# Patient Record
Sex: FEMALE | Race: WHITE | NOT HISPANIC OR LATINO | Employment: OTHER | URBAN - METROPOLITAN AREA
[De-identification: names, ages, dates, MRNs, and addresses within clinical notes are randomized per-mention and may not be internally consistent; named-entity substitution may affect disease eponyms.]

---

## 2017-09-12 ENCOUNTER — HOSPITAL ENCOUNTER (EMERGENCY)
Facility: HOSPITAL | Age: 37
Discharge: HOME | End: 2017-09-12
Admitting: EMERGENCY MEDICINE

## 2017-09-12 VITALS
DIASTOLIC BLOOD PRESSURE: 57 MMHG | TEMPERATURE: 98.8 F | HEIGHT: 65 IN | SYSTOLIC BLOOD PRESSURE: 95 MMHG | OXYGEN SATURATION: 99 % | RESPIRATION RATE: 20 BRPM | WEIGHT: 193 LBS | HEART RATE: 84 BPM | BODY MASS INDEX: 32.15 KG/M2

## 2017-09-12 DIAGNOSIS — R51.9 ACUTE NONINTRACTABLE HEADACHE, UNSPECIFIED HEADACHE TYPE: Primary | ICD-10-CM

## 2017-09-12 PROCEDURE — 25010000002 PROCHLORPERAZINE EDISYLATE PER 10 MG: Performed by: PHYSICIAN ASSISTANT

## 2017-09-12 PROCEDURE — 99283 EMERGENCY DEPT VISIT LOW MDM: CPT

## 2017-09-12 PROCEDURE — 96374 THER/PROPH/DIAG INJ IV PUSH: CPT

## 2017-09-12 PROCEDURE — 25010000002 KETOROLAC TROMETHAMINE PER 15 MG: Performed by: PHYSICIAN ASSISTANT

## 2017-09-12 PROCEDURE — 99282 EMERGENCY DEPT VISIT SF MDM: CPT | Performed by: PHYSICIAN ASSISTANT

## 2017-09-12 PROCEDURE — 96375 TX/PRO/DX INJ NEW DRUG ADDON: CPT

## 2017-09-12 PROCEDURE — 25010000002 DIPHENHYDRAMINE PER 50 MG: Performed by: PHYSICIAN ASSISTANT

## 2017-09-12 RX ORDER — TOPIRAMATE 50 MG/1
50 TABLET, FILM COATED ORAL 2 TIMES DAILY
COMMUNITY

## 2017-09-12 RX ORDER — RIZATRIPTAN BENZOATE 10 MG/1
10 TABLET ORAL ONCE AS NEEDED
COMMUNITY

## 2017-09-12 RX ORDER — SODIUM CHLORIDE 0.9 % (FLUSH) 0.9 %
10 SYRINGE (ML) INJECTION AS NEEDED
Status: DISCONTINUED | OUTPATIENT
Start: 2017-09-12 | End: 2017-09-12 | Stop reason: HOSPADM

## 2017-09-12 RX ORDER — ESCITALOPRAM OXALATE 20 MG/1
20 TABLET ORAL DAILY
COMMUNITY

## 2017-09-12 RX ORDER — DIPHENHYDRAMINE HYDROCHLORIDE 50 MG/ML
25 INJECTION INTRAMUSCULAR; INTRAVENOUS ONCE
Status: COMPLETED | OUTPATIENT
Start: 2017-09-12 | End: 2017-09-12

## 2017-09-12 RX ORDER — KETOROLAC TROMETHAMINE 30 MG/ML
30 INJECTION, SOLUTION INTRAMUSCULAR; INTRAVENOUS ONCE
Status: COMPLETED | OUTPATIENT
Start: 2017-09-12 | End: 2017-09-12

## 2017-09-12 RX ADMIN — PROCHLORPERAZINE EDISYLATE 10 MG: 5 INJECTION INTRAMUSCULAR; INTRAVENOUS at 20:21

## 2017-09-12 RX ADMIN — KETOROLAC TROMETHAMINE 30 MG: 30 INJECTION INTRAMUSCULAR; INTRAVENOUS at 20:21

## 2017-09-12 RX ADMIN — DIPHENHYDRAMINE HYDROCHLORIDE 25 MG: 50 INJECTION, SOLUTION INTRAMUSCULAR; INTRAVENOUS at 20:21

## 2017-09-13 NOTE — ED PROVIDER NOTES
Subjective   History of Present Illness  History of Present Illness    Chief complaint: Migraine    Location: Generalized bilaterally    Quality/Severity:  Throbbing, severe    Timing/Duration: Since 5:30 this morning    Modifying Factors: Nothing makes worse or better.  Patient has taken Maxalt ×3 without relief    Associated Symptoms: Positive photophobia.  Denies fevers or chills.  Denies neck stiffness.  Denies chest pain or shortness of breath.  Denies dizziness.  Denies nausea vomiting.    Narrative: 36-year-old female with a history of migraines presents with similar symptoms since this morning.  There is no change in character in this headache from her prior.  She is from [place] and has had multiple extensive workup there.  She does take Topamax for preventative measures but did not take it yesterday, which is what she thinks triggered this migraine.  She states that they tried to decrease her Maxalt 29 doses per month but that was not enough, but 12 tablets to last her the entire month.  She states this is his significant improvement from her previous.  She states she is up here getting away from her family in [place] including her mother and siblings.  She is here with her ex-.  She feels safe with him, but she does have tension with his sister.  She does not worry about her safety with his sister.    Review of Systems  General: Denies fevers or chills.  Denies any weakness or fatigue.  Denies any weight loss or weight gain.  SKIN: Denies any rashes lesions or ulcers.  Denies color change.  ENT: Denies sore throat or rhinorrhea.  Denies ear pain.    EYES: Denies any blurred vision.  Denies any change in vision.  Denies any photophobia.  Denies any vision loss.  LUNGS: Denies any shortness of breath or wheezing.  Denies any cough.  Denies any hemoptysis.  CARDIAC: Denies any chest pain.  Denies palpitations.  Denies syncope.  Denies any edema  ABD: Denies any abdominal pain.  Denies any nausea or  vomiting or diarrhea.  Denies any rectal bleeding.  Denies constipation  : Denies any dysuria, urgency, frequency or hematuria.  Denies discharge.  Denies flank pain.  NEURO: Denies any focal weakness.  +headache.  Denies seizures.  Denies changes in speech or difficulty walking.  ENDOCRINE: Denies polydipsia and polyuria  M/S: Denies arthralgias, back pain, myalgias or neck pain  HEME/LYMPH: Negative for adenopathy. Does not bruise/bleed easily.   PSYCH: Negative for suicidal ideas. Denies anxiety or depression  review was performed in addition to those in the above all other reviews are negative.      Past Medical History:   Diagnosis Date   • ADHD (attention deficit hyperactivity disorder)    • Bipolar 1 disorder    • Borderline personality disorder in adult    • Insomnia    • Migraine    • OCD (obsessive compulsive disorder)    • PCOS (polycystic ovarian syndrome)        Allergies   Allergen Reactions   • Lithium      Slurred speech, goes bald, trouble walking       Past Surgical History:   Procedure Laterality Date   • CHOLECYSTECTOMY     • CYST REMOVAL         History reviewed. No pertinent family history.    Social History     Social History   • Marital status:      Spouse name: N/A   • Number of children: N/A   • Years of education: N/A     Social History Main Topics   • Smoking status: Light Tobacco Smoker     Years: 20.00     Types: Cigars   • Smokeless tobacco: Never Used   • Alcohol use Yes      Comment: rarely   • Drug use: No   • Sexual activity: Not Asked     Other Topics Concern   • None     Social History Narrative   • None     No current facility-administered medications on file prior to encounter.      No current outpatient prescriptions on file prior to encounter.             Objective   Physical Exam  Vitals:    09/12/17 1944   BP: 132/95   Pulse: 84   Resp: 20   Temp: 98.8 °F (37.1 °C)   SpO2: 100%     GENERAL: a/o x 4, NAD, patient is wearing sunglasses  SKIN: Warm pink and dry    HEENT:  PERRLA, EOM intact, conjunctiva normal, sclera clear  NECK: supple, no JVD  LUNGS: Clear to auscultation bilaterally without wheezes, rales or rhonchi.  No accessory muscle use and no nasal flaring.  CARDIAC:  Regular rate and rhythm, S1-S2.  No murmurs, rubs or gallops.  No peripheral edema.  Equal pulses bilaterally.  ABDOMEN: Soft, nontender, nondistended.  No guarding or rebound tenderness.  Normal bowel sounds.  MUSCULOSKELETAL: Moves all extremities well.  No deformity.  NEURO: Cranial nerves II through XII grossly intact.  No gross focal deficits.  Alert.  Normal speech and motor.  PSYCH: Colorful      Procedures         ED Course  ED Course    compazine, toradol, benadryl given- pt has had extensive w/u in [place] with no change in character. No need to repeat imaging or blood work at this time.     2105- pt sleeping.  Head feels better. Pain decreased. Will d/c home.              MDM  Number of Diagnoses or Management Options  Acute nonintractable headache, unspecified headache type: new and does not require workup     Amount and/or Complexity of Data Reviewed  Tests in the medicine section of CPT®: ordered and reviewed    Risk of Complications, Morbidity, and/or Mortality  Presenting problems: moderate  Diagnostic procedures: minimal  Management options: moderate    Patient Progress  Patient progress: improved    My differential diagnosis for headache includes but is not limited to:  Migraine, cluster, ischemic stroke, subarachnoid hemorrhage, intracranial hemorrhage, vascular malformation, cerebral aneurysm, vascular dissection, vasculitis, temporal arteritis, malignant hypertension, pheochromocytoma, cerebral venous thrombosis, preeclampsia; bacterial meningitis, viral meningitis, fungal meningitis, encephalitis, brain abscess, pleural empyema, sinusitis, dental infection, influenza, viral syndrome; carbon monoxide exposure, analgesic abuse, hypoglycemia; trigeminal neuralgia, postherpetic  neuralgia, occipital neuralgia; subdural hematoma, concussion, musculoskeletal tension, cervical osteoarthritis; glaucoma, TMJ disease, pseudotumor cerebri, post LP headache, intracranial neoplasm, sleep apnea      Final diagnoses:   Acute nonintractable headache, unspecified headache type     EMR Dragon/Transcription disclaimer:      Much of this encounter note is an electronic transcription/translation of spoken language to printed text. The electronic translation of spoken language may permit erroneous, or at times, nonsensical words or phrases to be inadvertently transcribed; Although I have reviewed the note for such errors, some may still exist.            Abby Garcia PA-C  09/12/17 [id number]

## 2017-09-13 NOTE — ED NOTES
Went over discharge instructions with patient, alert and oriented at time of discharge, no questions at this time. Left ambulatory.       Pallavi Masters RN  09/12/17 [id number]